# Patient Record
Sex: MALE | Race: WHITE
[De-identification: names, ages, dates, MRNs, and addresses within clinical notes are randomized per-mention and may not be internally consistent; named-entity substitution may affect disease eponyms.]

---

## 2022-05-18 ENCOUNTER — HOSPITAL ENCOUNTER (OUTPATIENT)
Dept: HOSPITAL 61 - PNCL | Age: 72
Discharge: HOME | End: 2022-05-18
Attending: ANESTHESIOLOGY
Payer: MEDICARE

## 2022-05-18 DIAGNOSIS — Z82.49: ICD-10-CM

## 2022-05-18 DIAGNOSIS — Z79.899: ICD-10-CM

## 2022-05-18 DIAGNOSIS — M48.061: ICD-10-CM

## 2022-05-18 DIAGNOSIS — Z87.891: ICD-10-CM

## 2022-05-18 DIAGNOSIS — Z98.890: ICD-10-CM

## 2022-05-18 DIAGNOSIS — J44.9: ICD-10-CM

## 2022-05-18 DIAGNOSIS — M19.90: ICD-10-CM

## 2022-05-18 DIAGNOSIS — M79.604: ICD-10-CM

## 2022-05-18 DIAGNOSIS — M51.16: Primary | ICD-10-CM

## 2022-05-18 DIAGNOSIS — M79.605: ICD-10-CM

## 2022-05-18 PROCEDURE — G0463 HOSPITAL OUTPT CLINIC VISIT: HCPCS

## 2022-05-18 PROCEDURE — 62323 NJX INTERLAMINAR LMBR/SAC: CPT

## 2022-05-18 NOTE — PDOC4
Procedure Note:


ICD 10 Code:


ICD 10 Code:


M54.16


M51.36


M48.06





Procedure Note:


Patient was consented for lumbar epidural steroid injection with fluoroscopic 

guidance.  Risks were discussed including but not limited to: Bleeding, 

infection, possibility of epidural hematoma and subsequent neurological 

compromise, dural puncture, headaches, spinal cord and/or nerve damage, side 

effects of steroid medication, and poor results regarding pain control.  Patient

understands and wished to proceed.


Procedure is lumbar epidural steroid injection under local anesthetic using ster

ile prep and drape at the L4-5 level using C-arm fluoroscopic guidance in both 

AP and lateral views medications injected is 20 mg dexamethasone +10mL 

preservative-free normal saline and 2 mL contrast- condition at discharge is 

stable patient tolerated procedure well had no complications.











DIDIER TRACY MD               May 18, 2022 16:17

## 2022-05-18 NOTE — PDOC1
INITIAL PAIN CONSULT


DATE OF SERVICE:


DOS:


DATE: 5/18/22 


TIME: 16:11





CHIEF COMPLAINT:


Chief Complaint:


Low back and bilateral lower extremity pain





HISTORY OF PRESENT ILLNESS:


71-year-old male presents with history of pain in the low back bilateral lower 

extremities for about 3 years now worse after he fell off of a ladder from the 

second step from the top on April 1 of this year patient reports the pain was 

much worse after that with pain across the low back and the bilateral lower 

extremities radiating posterior gluteus posterior lateral thigh lateral anterior

thighs anteromedial thighs and posterior calves patient reports is worse with 

walking standing changing positions better with sitting or laying down does not 

awaken her from sleep at night does not bother him when he sitting when he walks

or stands up the pain is significant and after about 15 to 20 minutes he has to 

sit down once again patient reports is difficult to walk he has some significant

fatigability in both lower extremities but no loss of motor function patient 

reports no bowel or bladder incontinence.  She describes pain as constant and 

throbbing intermittent intensity changes with activity radiating down the legs 

as described across the low back as a cramping pain as well as aching and 

burning in the legs patient rates his disability rating 0-10 10 being the worst 

is an 8 with family home responsibilities occupation sexual behavior 10 with r

ecreational activities to a social activity 7 with self-care and life support 

activities.  Patient has been taking oxycodone which does decrease the pain for 

about 5 hours by about 80% patient reports he is also had chiropractic treatment

in the past is also doing stretching and strength exercises on his own has had 

no formal physical therapy recently.  Patient have MRI scan of lumbar spine 

showing significant stenosis at L3-4 and L4-5 with disc desiccation L2-3 L3-4 

and L4-5 with moderate spinal stenosis at L2-3 moderate bilateral neuroforaminal

narrowing at L3-4 and moderate to severe spinal stenosis at L4-5 with slight 

posterior disc bulge.





PAST MEDICAL HISTORY:


PMH:


COPD, arthritis, cigarette smoking





PREVIOUS SURGERIES:


Past Surgical Hx:


None





CURRENT MEDICATIONS:


Current Meds:





Active Scripts








 Medications  Dose


 Route/Sig


 Max Daily Dose Days Date Category


 


 Percocet 


 Mg Tablet **


  (Oxycodone/Acetaminophen)


 1 Each Tablet  1 Tab


 PO PRN Q6HRS PRN


  14 5/18/22 Rx











FAMILY HISTORY:


Family Hx:


Strokes hypertension heart disease and blood clots





SOCIAL HISTORY:


Social Hx:


Patient drinks 1 glass of wine about once a month does not use any illegal 

illicit recreational drugs smokes one third a pack of cigarettes a day and has 

for over 50 years continues to smoke, is  lives with his spouse and lives

locally in [facility] and is semiretired.





REVIEW OF SYSTEMS:


ROS:


Positive for those items mentioned in history of present illness, all systems 

are reviewed, otherwise negative ,and are complete full and well-documented on 

patient's chart.





PHYSICAL EXAM:


VS:


Blood pressure is 143/94 pulse 101 respirations 18 temperature 98.2 F height is

5 feet 9 inches weight is 144 pounds.


PE:


PHYSICAL EXAMINATION:





GENERAL: The patient is awake, alert, oriented, appropriate, very pleasant in 

demeanor, patient accompanied by his wife.


HEENT: Shows normocephalic, atraumatic.  Extraocular movements are intact and 

symmetrical.  Oral cavity: Mucous membranes moist and pink. 


NECK: Shows anterior throat supple without palpable lymphadenopathy noted.  

Swallow reflex symmetrical.


CHEST: Shows normal on inspection.  Breath sounds are clear bilaterally, coarse 

and distant but without rales or rhonchi but with slight expiratory wheezes 

bilaterally at the apices only.


HEART: Shows S1, S2 clear.  No murmurs auscultated.


ABDOMEN: Soft, nontender, nondistended.  No palpable organomegaly is noted.  


BACK: Shows spine grossly in the midline.  Normal-appearing cervical lordotic 

curvature.  There is mildly increased thoracic kyphosis, some minor flattening 

of the lumbar lordotic curvature.  Lumbar paraspinous muscles show symmetrical 

on inspection, on palpation shows some moderate tenderness diffusely throughout 

the upper, middle and lower distribution of the paraspinous muscles bilaterally 

and also into the lower thoracic paraspinous musculature, firm and tender, but 

without specific trigger points, without radiation of pain.  The patient has 

good rotational motion of the lumbar spine, both laterally as well as extension 

and flexion without significant difficulty.  No tenderness over the spinous 

processes, sacrum or sacroiliac regions.


EXTREMITIES: Lower extremities show deep tendon reflexes 1+ in the patellar and 

tendo calcaneus tendons.  Motor exam is 4 on a scale of 5 with right dorsif

lexion, extension, quadriceps and hamstring flexion and 4/5 on the left.  

Peripheral pulses are 1+ posterior tibial.  No peripheral edema is noted 

bilaterally.  Lower extremities are warm and dry to touch, equal in color and 

appearance.  Straight leg raise noted to be negative bilaterally.  Gaenslen's 

and Naresh's maneuvers are negative bilateral as well.  The patient is able to 

stand, stand on his toes without significant difficulty but does lose balance 

slightly with standing on his toes, walks with a slightly widened gait appear to

favor the right or left lower extremity significantly does not use any assistive

devices to ambulate.


SKIN: Shows warm and dry, good turgor.  No edema.  No sores, rashes or bruising 

throughout.





IMPRESSION:


Impression:


71-year-old male with proximate 3-year history of low back pain right and left 

lower extremities with radiation and radicular fashion worse after fall April 1, 2022.


MRI scan lumbar spine as noted


Arthritis


COPD


Cigarette smoking





Plan: Options were discussed with the patient and patient spouse recuperating at

his visit today including conservative medical managements physical therapies 

and medical techniques.  Patient techniques.  We discussed a lumbar epidural 

steroid injections description as well as anatomical models to describe the 

procedure.  Risks were discussed including but not limited to: Bleeding, 

infection, possibility of epidural hematoma and subsequent neurological 

compromise, dural puncture, headaches, spinal cord and/or nerve damage, side 

effects of steroid medication, and poor results regarding pain control.  Patient

understands and wished to proceed.  Patient will return to clinic in 

approximately 2 weeks for follow-up, was counseled as to return appointment, 

activity level, and side effects to be aware of.





Procedure is lumbar epidural steroid injection under local anesthetic using 

sterile prep and drape at the L4-5 level using C-arm fluoroscopic guidance in 

both AP and lateral views medications injected is 20 mg dexamethasone +10mL 

preservative-free normal saline and 2 mL contrast- condition at discharge is 

stable patient tolerated procedure well had no complications.











DIDIER TRACY MD               May 18, 2022 16:17